# Patient Record
Sex: FEMALE | Race: WHITE | HISPANIC OR LATINO
[De-identification: names, ages, dates, MRNs, and addresses within clinical notes are randomized per-mention and may not be internally consistent; named-entity substitution may affect disease eponyms.]

---

## 2024-08-22 ENCOUNTER — DASHBOARD ENCOUNTERS (OUTPATIENT)
Age: 49
End: 2024-08-22

## 2024-08-22 ENCOUNTER — OFFICE VISIT (OUTPATIENT)
Dept: URBAN - METROPOLITAN AREA CLINIC 80 | Facility: CLINIC | Age: 49
End: 2024-08-22
Payer: COMMERCIAL

## 2024-08-22 VITALS
HEIGHT: 61 IN | HEART RATE: 67 BPM | SYSTOLIC BLOOD PRESSURE: 110 MMHG | WEIGHT: 139 LBS | TEMPERATURE: 97.7 F | BODY MASS INDEX: 26.24 KG/M2 | DIASTOLIC BLOOD PRESSURE: 70 MMHG

## 2024-08-22 DIAGNOSIS — K65.4 SCLEROSING MESENTERITIS: ICD-10-CM

## 2024-08-22 PROCEDURE — 99204 OFFICE O/P NEW MOD 45 MIN: CPT | Performed by: PHYSICIAN ASSISTANT

## 2024-08-22 RX ORDER — ATORVASTATIN CALCIUM 20 MG/1
TAKE ONE TABLET BY MOUTH ONCE DAILY TABLET, FILM COATED ORAL
Qty: 90 EACH | Refills: 0 | Status: ACTIVE | COMMUNITY

## 2024-08-22 NOTE — PHYSICAL EXAM GASTROINTESTINAL
Abdomen, soft, slightly tender in LLQ and midlower abdomen, nondistended, no guarding or rigidity, no masses palpable, normal bowel sounds, Liver and Spleen, no hepatomegaly present, no hepatosplenomegaly, liver nontenderRectal, deferred

## 2024-08-22 NOTE — HPI-TODAY'S VISIT:
Pt had MRI 7/25/2024; 1.  Liver lesions likely represent hemangiomas.2.  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   she gets occasional abd pain - comes and goes - lower abdomen - has been for years - told she has fibroids and she has hx of UTIs (she gets 5 UTIs a year) no nauesa or emesis no fevers or chills normal bowel habit is 1 Bm a day no BRBPR or melena her last colonoscopy was 5 years ago and it was normal no family hx colon ca or polyps no unintentional weight loss